# Patient Record
Sex: FEMALE | Race: WHITE | NOT HISPANIC OR LATINO | Employment: OTHER | URBAN - NONMETROPOLITAN AREA
[De-identification: names, ages, dates, MRNs, and addresses within clinical notes are randomized per-mention and may not be internally consistent; named-entity substitution may affect disease eponyms.]

---

## 2021-10-16 PROCEDURE — 99282 EMERGENCY DEPT VISIT SF MDM: CPT | Performed by: STUDENT IN AN ORGANIZED HEALTH CARE EDUCATION/TRAINING PROGRAM

## 2021-10-16 PROCEDURE — 99283 EMERGENCY DEPT VISIT LOW MDM: CPT

## 2021-10-17 ENCOUNTER — HOSPITAL ENCOUNTER (EMERGENCY)
Facility: HOSPITAL | Age: 36
Discharge: HOME | End: 2021-10-17
Attending: STUDENT IN AN ORGANIZED HEALTH CARE EDUCATION/TRAINING PROGRAM | Admitting: STUDENT IN AN ORGANIZED HEALTH CARE EDUCATION/TRAINING PROGRAM
Payer: MEDICARE

## 2021-10-17 VITALS
OXYGEN SATURATION: 99 % | RESPIRATION RATE: 16 BRPM | SYSTOLIC BLOOD PRESSURE: 149 MMHG | TEMPERATURE: 98 F | DIASTOLIC BLOOD PRESSURE: 86 MMHG | HEART RATE: 104 BPM | WEIGHT: 293 LBS

## 2021-10-17 DIAGNOSIS — M25.571 BILATERAL ANKLE PAIN, UNSPECIFIED CHRONICITY: ICD-10-CM

## 2021-10-17 DIAGNOSIS — M25.572 BILATERAL ANKLE PAIN, UNSPECIFIED CHRONICITY: ICD-10-CM

## 2021-10-17 PROCEDURE — 250N000013 HC RX MED GY IP 250 OP 250 PS 637: Performed by: STUDENT IN AN ORGANIZED HEALTH CARE EDUCATION/TRAINING PROGRAM

## 2021-10-17 RX ORDER — ACETAMINOPHEN 325 MG/1
650 TABLET ORAL ONCE
Status: COMPLETED | OUTPATIENT
Start: 2021-10-17 | End: 2021-10-17

## 2021-10-17 RX ADMIN — ACETAMINOPHEN 650 MG: 325 TABLET, FILM COATED ORAL at 00:31

## 2021-10-17 ASSESSMENT — ENCOUNTER SYMPTOMS
GASTROINTESTINAL NEGATIVE: 1
CARDIOVASCULAR NEGATIVE: 1
NEUROLOGICAL NEGATIVE: 1
CONSTITUTIONAL NEGATIVE: 1
RESPIRATORY NEGATIVE: 1

## 2021-10-17 NOTE — ED PROVIDER NOTES
History     Chief Complaint   Patient presents with     Leg Swelling     Ankle Pain     HPI  Angelique Caro is a 36 year old female complicated past medical history including osteoarthritis as well as chronic pain, morbid obesity, and bipolar disorder presenting with bilateral ankle pain and swelling.  She noted the swelling last night.  She often has swelling in her bilateral legs.  She attempted to put them up above the level of her heart as this often helps, but feels like there is still swollen.  She feels like her right ankle is improving and swelling but her left ankle remains somewhat swollen.  She has not taken any pain medications at home.  She has not attempted to ice her ankles.  She has previously had swelling in her legs and was prescribed furosemide but has not taken this.  She has no history of blood clots.  She not currently on birth control.  No recent surgery.  To the legs.  She was walking around somewhat yesterday.  She has a weight loss doctor whom she is working on weight loss with.  She denies any recent fevers.  No rashes.  No nausea/vomiting/diarrhea.  No chest pain or shortness of breath.    Allergies:  Allergies   Allergen Reactions     Ketorolac      Tramadol Swelling     Metronidazole Nausea and Vomiting     Shellfish-Derived Products        Problem List:    There are no problems to display for this patient.       Past Medical History:    No past medical history on file.    Past Surgical History:    No past surgical history on file.    Family History:    No family history on file.    Social History:  Marital Status:  Single [1]  Social History     Tobacco Use     Smoking status: Not on file   Substance Use Topics     Alcohol use: Not on file     Drug use: Not on file        Medications:    No current outpatient medications on file.        Review of Systems   Constitutional: Negative.    HENT: Negative.    Respiratory: Negative.    Cardiovascular: Negative.    Gastrointestinal: Negative.     Genitourinary: Negative.    Skin: Negative.    Neurological: Negative.    Psychiatric/Behavioral: Negative for self-injury and suicidal ideas.       Physical Exam   BP: 145/90  Pulse: 102  Temp: 98  F (36.7  C)  Resp: 18  Weight: (!) 153.5 kg (338 lb 6.5 oz)  SpO2: 100 %      Physical Exam  Vitals and nursing note reviewed.   Constitutional:       General: She is not in acute distress.     Appearance: Normal appearance. She is obese. She is not ill-appearing, toxic-appearing or diaphoretic.   HENT:      Head: Normocephalic and atraumatic.      Right Ear: External ear normal.      Left Ear: External ear normal.      Nose: Nose normal.   Eyes:      Pupils: Pupils are equal, round, and reactive to light.   Cardiovascular:      Rate and Rhythm: Regular rhythm. Tachycardia present.      Pulses: Normal pulses.      Heart sounds: Normal heart sounds. No murmur heard.     Pulmonary:      Effort: Pulmonary effort is normal.      Breath sounds: Normal breath sounds.   Abdominal:      General: Abdomen is flat.      Palpations: Abdomen is soft.   Musculoskeletal:         General: Normal range of motion.      Cervical back: Normal range of motion.      Comments: Mild bilateral ankle swelling without erythema, warmth, or rash   Skin:     General: Skin is warm and dry.      Capillary Refill: Capillary refill takes less than 2 seconds.   Neurological:      General: No focal deficit present.      Mental Status: She is alert and oriented to person, place, and time.   Psychiatric:         Mood and Affect: Mood normal.         ED Course     ED Course as of Oct 17 0034   Sun Oct 17, 2021   0011 HR normally right around 100, not elevated from baseline today per chart review at [facility].        Procedures              No results found for this or any previous visit (from the past 24 hour(s)).    Medications   acetaminophen (TYLENOL) tablet 650 mg (650 mg Oral Given 10/17/21 0031)       Assessments & Plan (with Medical Decision Making)      I have reviewed the nursing notes.    No Evidence of acute infection.  There is no pitting edema to suggest CHF on him overload.  She has no tenderness or swelling in the calves and I do not suspect a DVT.  Her symptoms seem very musculoskeletal and she is able to ambulate.  No indication for imaging of the ankles.  Plan on conservative treatment with rest, ice, compression, and elevation.  She has not yet tried Tylenol, so we will try this.  She is allergic to ibuprofen and Toradol. She is agreeable to this plan.    Findings were discussed with the patients. Additional verbal instructions were discussed with patient as well. Instructed to follow up with a primary care provider within 2-3 days to manage medications. Also discussed specific warning signs and instructed to return to the ED if there are any concerns. Patient voiced understanding of instructions, questions were answered and the patient was discharged home in stable condition.    I have reviewed the findings, diagnosis, plan and need for follow up with the patient.    New Prescriptions    No medications on file       Final diagnoses:   Bilateral ankle pain, unspecified chronicity       10/16/2021   [facility]     Bhupendra Lopez MD  10/17/21 0034

## 2021-10-17 NOTE — DISCHARGE INSTRUCTIONS
- Please take Tylenol and any other previously prescribed pain medication for your symptoms  - Please return to the Emergency Room if you do not improve, feel worse, or have any new or concerning symptoms. We would especially want to see you back if you experience redness or warmth of the ankle, or a fever  - Please follow up with a primary care physician in 2-3 days

## 2024-11-19 ENCOUNTER — HOSPITAL ENCOUNTER (EMERGENCY)
Facility: HOSPITAL | Age: 39
Discharge: HOME | End: 2024-11-19
Attending: NURSE PRACTITIONER
Payer: COMMERCIAL

## 2024-11-19 ENCOUNTER — APPOINTMENT (OUTPATIENT)
Dept: GENERAL RADIOLOGY | Facility: HOSPITAL | Age: 39
End: 2024-11-19
Attending: NURSE PRACTITIONER
Payer: COMMERCIAL

## 2024-11-19 VITALS
HEART RATE: 78 BPM | OXYGEN SATURATION: 97 % | DIASTOLIC BLOOD PRESSURE: 74 MMHG | TEMPERATURE: 97.8 F | SYSTOLIC BLOOD PRESSURE: 120 MMHG | RESPIRATION RATE: 18 BRPM

## 2024-11-19 DIAGNOSIS — S63.502A WRIST SPRAIN, LEFT, INITIAL ENCOUNTER: Primary | ICD-10-CM

## 2024-11-19 LAB — GLUCOSE BLDC GLUCOMTR-MCNC: 104 MG/DL (ref 70–99)

## 2024-11-19 PROCEDURE — 73110 X-RAY EXAM OF WRIST: CPT | Mod: LT

## 2024-11-19 PROCEDURE — 82962 GLUCOSE BLOOD TEST: CPT

## 2024-11-19 PROCEDURE — 99213 OFFICE O/P EST LOW 20 MIN: CPT | Performed by: NURSE PRACTITIONER

## 2024-11-19 PROCEDURE — G0463 HOSPITAL OUTPT CLINIC VISIT: HCPCS

## 2024-11-19 ASSESSMENT — ACTIVITIES OF DAILY LIVING (ADL)
ADLS_ACUITY_SCORE: 0
ADLS_ACUITY_SCORE: 0

## 2024-11-19 ASSESSMENT — ENCOUNTER SYMPTOMS
JOINT SWELLING: 1
MYALGIAS: 1
CHILLS: 0
FEVER: 0

## 2024-11-19 NOTE — DISCHARGE INSTRUCTIONS
X-rays look good today.  Rest, apply ice packs and use the wrist brace that was given today.    Tylenol or ibuprofen as needed for pain.    Follow-up with primary doctor in 7 to 10 days if no improvement in symptoms.  Return to urgent care or Emergency Department for any worsening or concerning symptoms.

## 2024-11-19 NOTE — ED PROVIDER NOTES
History     Chief Complaint   Patient presents with    Wrist Pain     HPI  Angelique Caro is a 39 year old female who presents to urgent care for evaluation of left wrist pain.  Yesterday, patient states that she was trying to feed the kittens when her hand got stuck in the kennel resulting in an injury to her wrist.  She has swelling to dorsal left wrist along with increased pain when she tries to hyperextend her wrist.  She notes that she is ambidextrous.    Allergies:  Allergies   Allergen Reactions    Ketorolac     Shellfish-Derived Products Anaphylaxis    Ibuprofen Swelling     Throat swelling per pt    Tramadol Swelling and Hives    Metronidazole Nausea and Vomiting and Hives       Problem List:    There are no active problems to display for this patient.       Past Medical History:    History reviewed. No pertinent past medical history.    Past Surgical History:    History reviewed. No pertinent surgical history.    Family History:    History reviewed. No pertinent family history.    Social History:  Marital Status:  Single [1]        Medications:    No current outpatient medications on file.        Review of Systems   Constitutional:  Negative for chills and fever.   Musculoskeletal:  Positive for joint swelling and myalgias.   All other systems reviewed and are negative.      Physical Exam   BP: 120/74  Pulse: 78  Temp: 97.8  F (36.6  C)  Resp: 18  SpO2: 97 %      Physical Exam  Vitals and nursing note reviewed.   Constitutional:       Appearance: Normal appearance. She is not ill-appearing or toxic-appearing.   HENT:      Head: Atraumatic.   Eyes:      Pupils: Pupils are equal, round, and reactive to light.   Cardiovascular:      Rate and Rhythm: Normal rate.   Pulmonary:      Effort: Pulmonary effort is normal. No respiratory distress.   Musculoskeletal:         General: Swelling and tenderness present. No deformity.      Cervical back: Neck supple.      Right lower leg: No edema.      Left lower leg: No  edema.      Comments: Mild swelling to dorsal left wrist with tenderness to palpation to this area.  No snuffbox tenderness.  No erythema or bruising.  No obvious deformity.  Moving fingers with minimal difficulty.  Radial pulse intact.   Skin:     General: Skin is warm and dry.      Capillary Refill: Capillary refill takes less than 2 seconds.      Findings: No bruising or erythema.   Neurological:      Mental Status: She is alert and oriented to person, place, and time.         ED Course        Procedures       Results for orders placed or performed during the hospital encounter of 11/19/24 (from the past 24 hours)   XR Wrist Left G/E 3 Views    Narrative    PROCEDURE:  XR WRIST LEFT G/E 3 VIEWS    HISTORY: left wrist pain after smashing it in a kennel last night    COMPARISON: No relevant priors available for comparison    TECHNIQUE:  XR WRIST LEFT 4 VIEWS    FINDINGS:    No acute fracture or dislocation is identified. No suspicious osseous  lesion. The joint spaces are preserved.     No foreign body or subcutaneous emphysema.       Impression    IMPRESSION:    No acute osseous abnormality.    BLANQUITA NUNES MD         SYSTEM ID:  [id number]   Glucose by meter   Result Value Ref Range    GLUCOSE BY METER POCT 104 (H) 70 - 99 mg/dL       Medications - No data to display    Assessments & Plan (with Medical Decision Making)   39-year-old female with left wrist pain following an injury that occurred last night.  No obvious deformity.  Pulses intact.  X-ray negative for acute findings.  Discussed this with patient.  Patient placed in a wrist brace for a left wrist sprain.  Recommended Tylenol or ibuprofen as needed for pain.  Rest and apply ice packs for 20 minutes at a time.  Follow-up with primary doctor in 7 to 10 days for reevaluation if no improvement in symptoms.  Return to urgent care or emergency department for any worsening or concerning symptoms.    I have reviewed the nursing notes.    I have reviewed the  findings, diagnosis, plan and need for follow up with the patient.  This document was prepared using a combination of typing and voice generated software.  While every attempt was made for accuracy, spelling and grammatical errors may exist.         There are no discharge medications for this patient.      Final diagnoses:   Wrist sprain, left, initial encounter       11/19/2024   [facility]       Mpofu, Sandra, CNP  11/19/24 1560

## 2024-11-19 NOTE — ED TRIAGE NOTES
Pt presents with c/o having increased left wrist pain due to smashing it in a kennel last night   Denies any previous hx of fracture or injury   S/x happened last night   No otc meds taken today

## 2024-12-13 ENCOUNTER — HOSPITAL ENCOUNTER (EMERGENCY)
Facility: HOSPITAL | Age: 39
Discharge: HOME | End: 2024-12-13
Attending: PHYSICIAN ASSISTANT | Admitting: PHYSICIAN ASSISTANT
Payer: COMMERCIAL

## 2024-12-13 VITALS
HEIGHT: 65 IN | BODY MASS INDEX: 48.82 KG/M2 | WEIGHT: 293 LBS | RESPIRATION RATE: 18 BRPM | SYSTOLIC BLOOD PRESSURE: 137 MMHG | DIASTOLIC BLOOD PRESSURE: 76 MMHG | TEMPERATURE: 98.5 F | HEART RATE: 88 BPM | OXYGEN SATURATION: 96 %

## 2024-12-13 DIAGNOSIS — S09.93XA FACIAL TRAUMA, INITIAL ENCOUNTER: ICD-10-CM

## 2024-12-13 DIAGNOSIS — Z32.00 ENCOUNTER FOR PREGNANCY TEST: ICD-10-CM

## 2024-12-13 LAB — HCG UR QL: NEGATIVE

## 2024-12-13 PROCEDURE — 81025 URINE PREGNANCY TEST: CPT | Performed by: PHYSICIAN ASSISTANT

## 2024-12-13 PROCEDURE — G0463 HOSPITAL OUTPT CLINIC VISIT: HCPCS

## 2024-12-13 PROCEDURE — 99213 OFFICE O/P EST LOW 20 MIN: CPT | Performed by: PHYSICIAN ASSISTANT

## 2024-12-13 RX ORDER — MONTELUKAST SODIUM 10 MG/1
1 TABLET ORAL AT BEDTIME
COMMUNITY
Start: 2024-08-23

## 2024-12-13 RX ORDER — CLONIDINE HYDROCHLORIDE 0.1 MG/1
0.1 TABLET ORAL AT BEDTIME
COMMUNITY
Start: 2024-08-23

## 2024-12-13 RX ORDER — BUDESONIDE AND FORMOTEROL FUMARATE DIHYDRATE 160; 4.5 UG/1; UG/1
2 AEROSOL RESPIRATORY (INHALATION)
COMMUNITY
Start: 2024-08-23

## 2024-12-13 RX ORDER — VENLAFAXINE HYDROCHLORIDE 75 MG/1
75 TABLET, EXTENDED RELEASE ORAL
COMMUNITY
Start: 2024-08-23

## 2024-12-13 RX ORDER — PROPRANOLOL HYDROCHLORIDE 10 MG/1
10 TABLET ORAL 2 TIMES DAILY
COMMUNITY
Start: 2024-08-23

## 2024-12-13 RX ORDER — BUSPIRONE HYDROCHLORIDE 10 MG/1
10 TABLET ORAL 3 TIMES DAILY
COMMUNITY
Start: 2024-08-23

## 2024-12-13 RX ORDER — NYSTATIN 100000 [USP'U]/G
POWDER TOPICAL 3 TIMES DAILY PRN
COMMUNITY
Start: 2024-06-01

## 2024-12-13 RX ORDER — GUAIFENESIN 600 MG/1
600 TABLET, EXTENDED RELEASE ORAL 2 TIMES DAILY
COMMUNITY
Start: 2024-03-20

## 2024-12-13 RX ORDER — TIOTROPIUM BROMIDE 18 UG/1
18 CAPSULE ORAL; RESPIRATORY (INHALATION) DAILY
COMMUNITY
Start: 2024-08-23

## 2024-12-13 RX ORDER — CHOLECALCIFEROL (VITAMIN D3) 50 MCG
50 TABLET ORAL DAILY
COMMUNITY

## 2024-12-13 RX ORDER — METOPROLOL SUCCINATE 50 MG/1
50 TABLET, EXTENDED RELEASE ORAL DAILY
COMMUNITY
Start: 2024-08-23

## 2024-12-13 RX ORDER — BLOOD PRESSURE TEST KIT
KIT MISCELLANEOUS
COMMUNITY
Start: 2024-08-22

## 2024-12-13 RX ORDER — BACLOFEN 10 MG/1
10 TABLET ORAL 3 TIMES DAILY PRN
COMMUNITY

## 2024-12-13 RX ORDER — LAMOTRIGINE 200 MG/1
200 TABLET, EXTENDED RELEASE ORAL DAILY
COMMUNITY
Start: 2024-08-23

## 2024-12-13 RX ORDER — LORATADINE 10 MG/1
10 TABLET ORAL DAILY
COMMUNITY
Start: 2024-08-23

## 2024-12-13 RX ORDER — ONDANSETRON 4 MG/1
4 TABLET, FILM COATED ORAL EVERY 8 HOURS PRN
COMMUNITY
Start: 2024-05-04

## 2024-12-13 ASSESSMENT — ENCOUNTER SYMPTOMS: WOUND: 1

## 2024-12-13 ASSESSMENT — ACTIVITIES OF DAILY LIVING (ADL): ADLS_ACUITY_SCORE: 41

## 2024-12-14 NOTE — ED PROVIDER NOTES
History     Chief Complaint   Patient presents with    Oral Swelling     HPI  Angelique Caro is a 39 year old female who presents to urgent care for evaluation injury to face.  Patient states that she accidentally opened a car door into her mouth which caused a small laceration to the outside of her lower lip.  Bleeding is controlled with direct pressure.  Patient also states that she has not had a period since July and that she has been having unprotected sex.  She states that she has been having nausea and vomiting over the past week and would like a pregnancy test.  Patient denies any abnormal vaginal discharge, vaginal bleeding, abdominal pain.  Allergies:  Allergies   Allergen Reactions    Ketorolac     Shellfish-Derived Products Anaphylaxis    Ibuprofen Swelling     Throat swelling per pt    Tramadol Swelling and Hives    Metronidazole Nausea and Vomiting and Hives       Problem List:    There are no active problems to display for this patient.       Past Medical History:    No past medical history on file.    Past Surgical History:    No past surgical history on file.    Family History:    No family history on file.    Social History:  Marital Status:  Single [1]        Medications:    Alcohol Swabs PADS  baclofen (LIORESAL) 10 MG tablet  budesonide-formoterol (SYMBICORT) 160-4.5 MCG/ACT Inhaler  busPIRone (BUSPAR) 10 MG tablet  cloNIDine (CATAPRES) 0.1 MG tablet  empagliflozin (JARDIANCE) 25 MG TABS tablet  LamoTRIgine (LAMICTAL) 200 MG TB24  loratadine (CLARITIN) 10 MG tablet  metoprolol succinate ER (TOPROL XL) 50 MG 24 hr tablet  montelukast (SINGULAIR) 10 MG tablet  MUCUS RELIEF 600 MG 12 hr tablet  nystatin (MYCOSTATIN) 498784 UNIT/GM external powder  omeprazole (PRILOSEC) 20 MG DR capsule  ondansetron (ZOFRAN) 4 MG tablet  propranolol (INDERAL) 10 MG tablet  tiotropium (SPIRIVA) 18 MCG inhaled capsule  venlafaxine (EFFEXOR-ER) 75 MG 24 hr tablet  vitamin D3 (CHOLECALCIFEROL) 50 mcg (2000 units)  "tablet          Review of Systems   Skin:  Positive for wound.   All other systems reviewed and are negative.      Physical Exam   BP: 137/76  Pulse: 88  Temp: 98.5  F (36.9  C)  Resp: 18  Height: 165.1 cm (5' 5\")  Weight: (!) 153.8 kg (339 lb)  SpO2: 96 %      Physical Exam  Vitals and nursing note reviewed.   Constitutional:       General: She is not in acute distress.     Appearance: Normal appearance. She is not ill-appearing or toxic-appearing.   HENT:      Mouth/Throat:      Dentition: Abnormal dentition. Dental tenderness present.        Comments: Patient has approximately 2 mm x 2 mm superficial wound to left lower aspect of lip.  Mild swelling noted of lower lip.  This does not appear to be a puncture wound.  Cardiovascular:      Rate and Rhythm: Regular rhythm.      Heart sounds: Normal heart sounds.   Pulmonary:      Breath sounds: Normal breath sounds.         ED Course        Procedures             Critical Care time:               No results found for this or any previous visit (from the past 24 hours).    Medications - No data to display    Assessments & Plan (with Medical Decision Making)   #1.  Facial trauma  #2.  Encounter for pregnancy test    Discussed exam findings with patient.  Patient is encouraged to ice lower lip.  Tylenol or ibuprofen as directed for pain.  Wound cares discussed.  Any sign of infection patient should return to emergency department immediately.  Patient has urine hCG pending and will be notified of results; patient also has MyChart to view results.  Any additional concerns patient can return to urgent care or follow-up with primary care provider.  Patient verbalized understanding and agreement of plan.      I have reviewed the nursing notes.    I have reviewed the findings, diagnosis, plan and need for follow up with the patient.        New Prescriptions    No medications on file       Final diagnoses:   Facial trauma, initial encounter   Encounter for pregnancy test "       12/13/2024   [facility]       Koko Ritchie PA-C  12/13/24 2017

## 2024-12-14 NOTE — ED TRIAGE NOTES
Pt presents with swelling to bottom lip x15-20 min ago today. Pt stated she opened the car door to fast and it swung and hit her in her mouth. Pt is not taking any OTC medications. Pt stated she would like a pregnancy test as she is having nausea and vomiting and unprotected sex. Unknown [facility].